# Patient Record
Sex: FEMALE | Race: WHITE
[De-identification: names, ages, dates, MRNs, and addresses within clinical notes are randomized per-mention and may not be internally consistent; named-entity substitution may affect disease eponyms.]

---

## 2018-10-26 ENCOUNTER — HOSPITAL ENCOUNTER (EMERGENCY)
Dept: HOSPITAL 25 - UCCORT | Age: 33
Discharge: HOME | End: 2018-10-26
Payer: COMMERCIAL

## 2018-10-26 VITALS — SYSTOLIC BLOOD PRESSURE: 150 MMHG | DIASTOLIC BLOOD PRESSURE: 84 MMHG

## 2018-10-26 DIAGNOSIS — K11.20: Primary | ICD-10-CM

## 2018-10-26 PROCEDURE — 99212 OFFICE O/P EST SF 10 MIN: CPT

## 2018-10-26 PROCEDURE — G0463 HOSPITAL OUTPT CLINIC VISIT: HCPCS

## 2018-10-26 NOTE — UC
Ear Complaint HPI





- HPI Summary


HPI Summary: 





per RN triage "left ear pain for past 10 days. " pain is severe. hurts over her 

left cheek and jaw. no swimming. no fever. uncertain that she has eaten 

anything sour. 





- History of Current Complaint


Chief Complaint: UCEar


Stated Complaint: LEFT EAR PAIN


Time Seen by Provider: 10/26/18 20:38


Hx Last Menstrual Period: 11/5/17


Pain Intensity: 7





- Allergies/Home Medications


Allergies/Adverse Reactions: 


 Allergies











Allergy/AdvReac Type Severity Reaction Status Date / Time


 


No Known Allergies Allergy   Verified 10/26/18 19:49














PMH/Surg Hx/FS Hx/Imm Hx


Previously Healthy: Yes





- Surgical History


Surgical History: Yes


Surgery Procedure, Year, and Place: C-Sections, 2006 2008 2011, [place]





- Family History


Known Family History: Positive: Hypertension





- Social History


Alcohol Use: None


Substance Use Type: None


Smoking Status (MU): Never Smoked Tobacco





- Immunization History


Most Recent Influenza Vaccination: Not the 2017/2018 Season





Review of Systems


Constitutional: Negative


Skin: Negative


Eyes: Negative


ENT: Ear Ache


Respiratory: Negative


Cardiovascular: Negative


Gastrointestinal: Negative


Genitourinary: Negative


Motor: Negative


Neurovascular: Negative


Musculoskeletal: Negative


Neurological: Negative


Psychological: Negative


Is Patient Immunocompromised?: No


All Other Systems Reviewed And Are Negative: Yes





Physical Exam


Triage Information Reviewed: Yes


Appearance: Pain Distress - mild


Vital Signs: 


 Initial Vital Signs











Temp  98.2 F   10/26/18 19:44


 


Pulse  88   10/26/18 19:44


 


Resp  15   10/26/18 19:44


 


BP  150/84   10/26/18 19:44


 


Pulse Ox  100   10/26/18 19:44











Vital Signs Reviewed: Yes


Eye Exam: Normal


ENT: Positive: Pharynx normal, TMs normal, Other - mild swelling over left jaw. 

left ear displace caudally. tender over left parotid. left external ear, non-

tender. canal nml, TM nml..  Negative: Pharyngeal erythema, Sinus tenderness


Neck exam: Normal


Neck: Positive: Supple, Nontender, No Lymphadenopathy


Respiratory Exam: Normal


Respiratory: Positive: Lungs clear, Normal breath sounds, No respiratory 

distress, No accessory muscle use.  Negative: Crackles, Rhonchi, Stridor, 

Wheezing


Cardiovascular Exam: Normal


Cardiovascular: Positive: RRR


Abdomen Description: Positive: Nontender, Soft


Musculoskeletal Exam: Normal


Neurological Exam: Normal


Psychological Exam: Normal


Skin Exam: Normal





Ear Complaint Course/Dx





- Differential Dx/Diagnosis


Differential Diagnosis/HQI/PQRI: Otitis Externa, Otitis Media, Perforated TM, 

Trigeminal Nueralgia


Provider Diagnoses: parotiditis left





Discharge





- Sign-Out/Discharge


Documenting (check all that apply): Patient Departure


All imaging exams completed and their final reports reviewed: No Studies





- Discharge Plan


Condition: Stable


Disposition: HOME


Prescriptions: 


Amoxicillin/Clavulanate TAB* [Augmentin *] 875 mg PO BID #20 tab


Patient Education Materials:  Sialoadenitis (ED)


Referrals: 


MILKA Ye [Primary Care Provider] - 5 Days


Additional Instructions: 


-Make sure to take a probiotic daily while on antibiotics to help prevent a 

potential complication of antibiotic use called c diff. Some well known brands 

that can be found OTC are florastor, align and colon health.  Make sure to 

complete the entire prescription unless advised otherwise by your health care 

provider.


-If symptoms increase or persist, recommendation to get a CT scan and follow up 

with ENT





- Billing Disposition and Condition


Condition: STABLE


Disposition: Home

## 2018-10-29 ENCOUNTER — HOSPITAL ENCOUNTER (EMERGENCY)
Dept: HOSPITAL 25 - UCCORT | Age: 33
Discharge: HOME | End: 2018-10-29
Payer: COMMERCIAL

## 2018-10-29 VITALS — SYSTOLIC BLOOD PRESSURE: 138 MMHG | DIASTOLIC BLOOD PRESSURE: 68 MMHG

## 2018-10-29 DIAGNOSIS — M26.622: Primary | ICD-10-CM

## 2018-10-29 DIAGNOSIS — Z79.2: ICD-10-CM

## 2018-10-29 PROCEDURE — 99212 OFFICE O/P EST SF 10 MIN: CPT

## 2018-10-29 PROCEDURE — G0463 HOSPITAL OUTPT CLINIC VISIT: HCPCS

## 2018-10-29 NOTE — UC
Ear Complaint HPI





- HPI Summary


HPI Summary: 





34 yo female presents with LEFT TMJ/Ear pain for the last 10 days. She tells me 

that she was seen here on 10/26 and dx'd with possible abscess/ear infection. 

Pt has had pain at her left TMJ for 10 days that started spontaneously. Pain 

worsens with opening and closing her jaw. She has been taking Augmentin since 10

/26 with no change in her symptoms. She has been taking ibuprofen with no 

relief. Denies fever, chills, hearing changes, injury, numbness, tingling, or 

radiation of pain.











- History of Current Complaint


Chief Complaint: UCEar


Stated Complaint: EAR PAIN


Time Seen by Provider: 10/29/18 14:59


Hx Obtained From: Patient


Hx Last Menstrual Period: 11/5/17


Onset/Duration: Sudden Onset


Severity Initially: Severe


Severity Currently: Severe


Pain Intensity: 8


Pain Scale Used: 0-10 Numeric





- Allergies/Home Medications


Allergies/Adverse Reactions: 


 Allergies











Allergy/AdvReac Type Severity Reaction Status Date / Time


 


No Known Allergies Allergy   Verified 10/29/18 14:55











Home Medications: 


 Home Medications





Ibuprofen TAB* [Motrin TAB* 800 MG] 800 mg PO ONCE 10/29/18 [History Confirmed 

10/29/18]











PMH/Surg Hx/FS Hx/Imm Hx





- Additional Past Medical History


Additional PMH: 





None





- Surgical History


Surgical History: Yes


Surgery Procedure, Year, and Place: C-Sections, 2006 2008 2011, [place]





- Family History


Known Family History: Positive: Hypertension





- Social History


Occupation: Employed Full-time


Lives: With Family


Alcohol Use: None


Substance Use Type: None


Smoking Status (MU): Never Smoked Tobacco





- Immunization History


Most Recent Influenza Vaccination: Not the 2017/2018 Season





Review of Systems


Constitutional: Negative


Skin: Negative


Eyes: Negative


ENT: Negative


Respiratory: Negative


Cardiovascular: Negative


Musculoskeletal: Other: - Left TMJ pain


Neurological: Negative


Psychological: Negative


All Other Systems Reviewed And Are Negative: Yes





Physical Exam





- Summary


Physical Exam Summary: 





GENERAL: NAD. WDWN. No pain distress.


SKIN: No rashes, sores, lesions, or open wounds.


HEENT:


            Head: AT/NC


            Eyes:  EOM intact. Conjunctiva clear without inflammation or 

discharge.


            Ears: Hearing grossly normal. TMs intact, no bulging, erythema, or 

edema. 


            Nose: Nasal mucosa pink and moist. NTTP maxillary and frontal 

sinus. 


            Throat: Posterior oropharynx without exudates, erythema, or 

tonsillar enlargement.  Uvula midline.


NECK: Supple. Nontender. No lymphadenopathy. 


CHEST:  CTAB. No r/r/w. No accessory muscle use. Breathing comfortably and in 

no distress.


CV:  RRR. Without m/r/g. Pulses intact. Cap refill <2seconds. No temporal 

artery tenderness.


MSK: Moderate TTP over left TMJ. Pain reproduced with opening and closing of 

mouth. 


NEURO: Alert. 


PSYCH: Age appropriate behavior.


Triage Information Reviewed: Yes


Vital Signs: 


 Initial Vital Signs











Temp  97.8 F   10/29/18 14:47


 


Pulse  88   10/29/18 14:47


 


Resp  16   10/29/18 14:47


 


BP  138/68   10/29/18 14:47


 


Pulse Ox  100   10/29/18 14:47











Vital Signs Reviewed: Yes





Ear Complaint Course/Dx





- Course


Course Of Treatment: Suspect TMJ. Advised to stop Augmentin. Will try her with 

naproxen and prednisone and have her f/u with ENT.





- Differential Dx/Diagnosis


Provider Diagnoses: Left TMJ pain





Discharge





- Sign-Out/Discharge


Documenting (check all that apply): Patient Departure


All imaging exams completed and their final reports reviewed: No Studies





- Discharge Plan


Condition: Stable


Disposition: HOME


Prescriptions: 


Naproxen [Naproxen 500 mg tab] 500 mg PO BID PRN #30 tablet


 PRN Reason: Pain


predniSONE TAB* [Deltasone 20 MG TAB*] 40 mg PO DAILY #15 tab


Patient Education Materials:  Temporomandibular Disorder (ED)


Referrals: 


No Primary Care Phys,NOPCP [Primary Care Provider] - 


Liban Richmond MD [Medical Doctor] - As Soon As Possible


Additional Instructions: 


If you develop a fever, shortness of breath, chest pain, new or worsening 

symptoms - please call your PCP or go to the ED.


 





Your blood pressure was high at todays visit. Please see your primary provider 

within 4 weeks for recheck and re-evaluation.








1) Do not take ibuprofen/aleve/advil with the Naproxen as these medications are 

similar and may interact


2) Please call ENT at the number below to schedule a follow up appointment as 

soon as possible for further evaluation





- Billing Disposition and Condition


Condition: STABLE


Disposition: Home